# Patient Record
Sex: FEMALE | URBAN - METROPOLITAN AREA
[De-identification: names, ages, dates, MRNs, and addresses within clinical notes are randomized per-mention and may not be internally consistent; named-entity substitution may affect disease eponyms.]

---

## 2022-07-28 ENCOUNTER — APPOINTMENT (OUTPATIENT)
Dept: URBAN - METROPOLITAN AREA CLINIC 253 | Age: 67
Setting detail: DERMATOLOGY
End: 2022-08-01

## 2022-07-28 VITALS — WEIGHT: 135 LBS | RESPIRATION RATE: 14 BRPM | HEIGHT: 63 IN

## 2022-07-28 DIAGNOSIS — Q82.8 OTHER SPECIFIED CONGENITAL MALFORMATIONS OF SKIN: ICD-10-CM

## 2022-07-28 DIAGNOSIS — M67.4 GANGLION: ICD-10-CM

## 2022-07-28 PROBLEM — M67.432 GANGLION, LEFT WRIST: Status: ACTIVE | Noted: 2022-07-28

## 2022-07-28 PROCEDURE — OTHER PRESCRIPTION: OTHER

## 2022-07-28 PROCEDURE — 99203 OFFICE O/P NEW LOW 30 MIN: CPT

## 2022-07-28 PROCEDURE — OTHER COUNSELING: OTHER

## 2022-07-28 PROCEDURE — OTHER ADDITIONAL NOTES: OTHER

## 2022-07-28 RX ORDER — CLOBETASOL PROPIONATE 0.5 MG/G
0.05% CREAM TOPICAL BID
Qty: 60 | Refills: 1 | Status: ERX | COMMUNITY
Start: 2022-07-28

## 2022-07-28 ASSESSMENT — LOCATION SIMPLE DESCRIPTION DERM
LOCATION SIMPLE: LEFT WRIST
LOCATION SIMPLE: RIGHT UPPER ARM

## 2022-07-28 ASSESSMENT — LOCATION DETAILED DESCRIPTION DERM
LOCATION DETAILED: RIGHT ANTERIOR PROXIMAL UPPER ARM
LOCATION DETAILED: LEFT LATERAL DORSAL WRIST

## 2022-07-28 ASSESSMENT — LOCATION ZONE DERM: LOCATION ZONE: ARM

## 2022-07-28 NOTE — HPI: SUBCUTANEOUS GROWTH
How Severe Is Your Growth?: severe
Has Your Growth Been Treated?: not been treated
Is This A New Presentation, Or A Follow-Up?: Subcutaneous Growth
Additional History: She tried to catch a softball and it hit her wrist. As it healed, she noticed a new growth on her wrist.

## 2022-07-28 NOTE — HPI: RASH
What Type Of Note Output Would You Prefer (Optional)?: Standard Output
Is The Patient Presenting As Previously Scheduled?: Yes
How Severe Is Your Rash?: severe
Is This A New Presentation, Or A Follow-Up?: Rash
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

## 2023-06-12 ENCOUNTER — APPOINTMENT (OUTPATIENT)
Dept: URBAN - METROPOLITAN AREA CLINIC 253 | Age: 68
Setting detail: DERMATOLOGY
End: 2023-06-12

## 2023-06-12 VITALS — WEIGHT: 130 LBS | HEIGHT: 63 IN | RESPIRATION RATE: 14 BRPM

## 2023-06-12 DIAGNOSIS — Q82.8 OTHER SPECIFIED CONGENITAL MALFORMATIONS OF SKIN: ICD-10-CM

## 2023-06-12 DIAGNOSIS — M71 OTHER BURSOPATHIES: ICD-10-CM

## 2023-06-12 PROBLEM — M71.332 OTHER BURSAL CYST, LEFT WRIST: Status: ACTIVE | Noted: 2023-06-12

## 2023-06-12 PROCEDURE — OTHER PHOTO-DOCUMENTATION: OTHER

## 2023-06-12 PROCEDURE — OTHER COUNSELING: OTHER

## 2023-06-12 PROCEDURE — OTHER ADDITIONAL NOTES: OTHER

## 2023-06-12 PROCEDURE — 99213 OFFICE O/P EST LOW 20 MIN: CPT | Mod: 25

## 2023-06-12 PROCEDURE — OTHER INCISION AND DRAINAGE: OTHER

## 2023-06-12 PROCEDURE — OTHER PRESCRIPTION: OTHER

## 2023-06-12 PROCEDURE — OTHER MIPS QUALITY: OTHER

## 2023-06-12 PROCEDURE — 10060 I&D ABSCESS SIMPLE/SINGLE: CPT

## 2023-06-12 RX ORDER — CLOBETASOL PROPIONATE 0.5 MG/G
0.05% CREAM TOPICAL BID
Qty: 60 | Refills: 3 | Status: ERX | COMMUNITY
Start: 2023-06-12

## 2023-06-12 ASSESSMENT — LOCATION DETAILED DESCRIPTION DERM
LOCATION DETAILED: LEFT LATERAL DORSAL WRIST
LOCATION DETAILED: LEFT KNEE

## 2023-06-12 ASSESSMENT — LOCATION ZONE DERM
LOCATION ZONE: LEG
LOCATION ZONE: ARM

## 2023-06-12 ASSESSMENT — LOCATION SIMPLE DESCRIPTION DERM
LOCATION SIMPLE: LEFT KNEE
LOCATION SIMPLE: LEFT WRIST

## 2023-06-12 NOTE — PROCEDURE: ADDITIONAL NOTES
Additional Notes: Dermal sutures: 4.0 PGCL, simple interrupted, 2 sutures.\\nInitial size: 2 x 2 cm\\nFinal wound length: 1.4 cm
Detail Level: Simple
Render Risk Assessment In Note?: no

## 2023-06-12 NOTE — HPI: RASH
What Type Of Note Output Would You Prefer (Optional)?: Standard Output
How Severe Is Your Rash?: mild
Is This A New Presentation, Or A Follow-Up?: Follow Up Rash
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

## 2023-06-12 NOTE — PROCEDURE: INCISION AND DRAINAGE
Detail Level: Detailed
Lesion Type: Myxoid Cyst
Method: 15 blade
Curette: No
Include Sutures?: Yes
Anesthesia Type: 2% lidocaine with epinephrine and a 1:10 solution of 8.4% sodium bicarbonate
Anesthesia Volume In Cc: 0.6
Size Of Lesion In Cm (Optional But May Be Required For Some Insurances): 2
Drainage Type?: clear
Wound Care: Petrolatum
Dressing: pressure dressing with telfa
Epidermal Sutures: 5-0 Prolene
Epidermal Closure: running
Suture Text: The incision was closed with
Preparation Text: The area was prepped in the usual clean fashion.
Curette Text (Optional): After the contents were expressed a curette was used to partially remove the cyst wall.
Consent was obtained and risks were reviewed including but not limited to delayed wound healing, infection, need for multiple I and D's, and pain.
Post-Care Instructions: I reviewed with the patient in detail post-care instructions. Patient should keep wound covered and call the office should any redness, pain, swelling or worsening occur.

## 2023-06-15 NOTE — PROCEDURE: ADDITIONAL NOTES
Render Risk Assessment In Note?: no
Detail Level: Detailed
Additional Notes: Pt will schedule 30 min excision for removal, will start with I&D.
Patient/Father

## 2023-06-26 ENCOUNTER — APPOINTMENT (OUTPATIENT)
Dept: URBAN - METROPOLITAN AREA CLINIC 253 | Age: 68
Setting detail: DERMATOLOGY
End: 2023-06-26

## 2023-06-26 DIAGNOSIS — Z48.02 ENCOUNTER FOR REMOVAL OF SUTURES: ICD-10-CM

## 2023-06-26 PROCEDURE — OTHER PHOTO-DOCUMENTATION: OTHER

## 2023-06-26 PROCEDURE — OTHER SUTURE REMOVAL (GLOBAL PERIOD): OTHER

## 2023-06-26 ASSESSMENT — LOCATION DETAILED DESCRIPTION DERM: LOCATION DETAILED: LEFT RADIAL DORSAL HAND

## 2023-06-26 ASSESSMENT — LOCATION ZONE DERM: LOCATION ZONE: HAND

## 2023-06-26 ASSESSMENT — LOCATION SIMPLE DESCRIPTION DERM: LOCATION SIMPLE: LEFT HAND

## 2023-06-26 NOTE — PROCEDURE: SUTURE REMOVAL (GLOBAL PERIOD)
Detail Level: Detailed
Add 88393 Cpt? (Important Note: In 2017 The Use Of 01607 Is Being Tracked By Cms To Determine Future Global Period Reimbursement For Global Periods): no